# Patient Record
Sex: FEMALE | Race: WHITE | NOT HISPANIC OR LATINO | Employment: PART TIME | URBAN - METROPOLITAN AREA
[De-identification: names, ages, dates, MRNs, and addresses within clinical notes are randomized per-mention and may not be internally consistent; named-entity substitution may affect disease eponyms.]

---

## 2018-08-13 ENCOUNTER — OFFICE VISIT (OUTPATIENT)
Dept: URGENT CARE | Facility: CLINIC | Age: 53
End: 2018-08-13
Payer: MEDICAID

## 2018-08-13 VITALS
OXYGEN SATURATION: 98 % | BODY MASS INDEX: 27.21 KG/M2 | HEIGHT: 59 IN | SYSTOLIC BLOOD PRESSURE: 140 MMHG | WEIGHT: 135 LBS | HEART RATE: 80 BPM | DIASTOLIC BLOOD PRESSURE: 90 MMHG | TEMPERATURE: 97 F

## 2018-08-13 DIAGNOSIS — F32.A DEPRESSION, UNSPECIFIED DEPRESSION TYPE: ICD-10-CM

## 2018-08-13 DIAGNOSIS — E11.8 TYPE 2 DIABETES MELLITUS WITH COMPLICATION, WITH LONG-TERM CURRENT USE OF INSULIN: Primary | ICD-10-CM

## 2018-08-13 DIAGNOSIS — Z76.0 ENCOUNTER FOR MEDICATION REFILL: ICD-10-CM

## 2018-08-13 DIAGNOSIS — Z79.4 TYPE 2 DIABETES MELLITUS WITH COMPLICATION, WITH LONG-TERM CURRENT USE OF INSULIN: Primary | ICD-10-CM

## 2018-08-13 PROCEDURE — 99203 OFFICE O/P NEW LOW 30 MIN: CPT | Mod: S$GLB,,, | Performed by: NURSE PRACTITIONER

## 2018-08-13 RX ORDER — SERTRALINE HYDROCHLORIDE 50 MG/1
50 TABLET, FILM COATED ORAL DAILY
Qty: 30 TABLET | Refills: 0 | Status: SHIPPED | OUTPATIENT
Start: 2018-08-13 | End: 2020-05-21

## 2018-08-13 RX ORDER — INSULIN GLARGINE 100 [IU]/ML
53 INJECTION, SOLUTION SUBCUTANEOUS NIGHTLY
Qty: 15.9 ML | Refills: 0 | Status: SHIPPED | OUTPATIENT
Start: 2018-08-13 | End: 2019-12-20

## 2018-08-13 NOTE — PROGRESS NOTES
"Subjective:       Patient ID: Rufina Braun is a 52 y.o. female.    Vitals:  height is 4' 11" (1.499 m) and weight is 61.2 kg (135 lb). Her temperature is 97.1 °F (36.2 °C). Her blood pressure is 140/90 (abnormal) and her pulse is 80. Her oxygen saturation is 98%.     Chief Complaint: Depression (anxiety med refill) and Diabetes (needs insulin refill)    Pt reports she needs a refill on her insulin medication and anxiety/depression medication she ran out of medications. She does not have any complaints today. She recently had to switch doctors due to an insurance change. She is unable to get in to see a new primary care doctor until 08/20. She needs refills on her Lantus and Zoloft.       Diabetes   She has type 2 diabetes mellitus. Her disease course has been fluctuating. Pertinent negatives for hypoglycemia include no headaches or nervousness/anxiousness. Pertinent negatives for diabetes include no blurred vision and no chest pain. Current diabetic treatment includes insulin injections. Her weight is stable. She is following a diabetic diet. She has not had a previous visit with a dietitian.     Review of Systems   Constitution: Negative for chills and fever.   HENT: Negative for sore throat.    Eyes: Negative for blurred vision.   Cardiovascular: Negative for chest pain.   Respiratory: Negative for shortness of breath.    Skin: Negative for rash.   Musculoskeletal: Negative for back pain and joint pain.   Gastrointestinal: Negative for abdominal pain, diarrhea, nausea and vomiting.   Neurological: Negative for headaches.   Psychiatric/Behavioral: The patient is not nervous/anxious.        Objective:      Physical Exam   Constitutional: She is oriented to person, place, and time. She appears well-developed and well-nourished. She is cooperative.  Non-toxic appearance. She does not appear ill. No distress.   HENT:   Head: Normocephalic and atraumatic.   Right Ear: Hearing, tympanic membrane, external ear and ear " canal normal.   Left Ear: Hearing, tympanic membrane, external ear and ear canal normal.   Nose: Nose normal. No mucosal edema, rhinorrhea or nasal deformity. No epistaxis. Right sinus exhibits no maxillary sinus tenderness and no frontal sinus tenderness. Left sinus exhibits no maxillary sinus tenderness and no frontal sinus tenderness.   Mouth/Throat: Uvula is midline, oropharynx is clear and moist and mucous membranes are normal. No trismus in the jaw. Normal dentition. No uvula swelling. No posterior oropharyngeal erythema.   Eyes: Conjunctivae and lids are normal. Right eye exhibits no discharge. Left eye exhibits no discharge. No scleral icterus.   Sclera clear bilat   Neck: Trachea normal, normal range of motion, full passive range of motion without pain and phonation normal. Neck supple.   Cardiovascular: Normal rate, regular rhythm, normal heart sounds, intact distal pulses and normal pulses.   Pulmonary/Chest: Effort normal and breath sounds normal. No respiratory distress.   Abdominal: Soft. Normal appearance and bowel sounds are normal. She exhibits no distension, no pulsatile midline mass and no mass. There is no tenderness.   Musculoskeletal: Normal range of motion. She exhibits no edema or deformity.   Neurological: She is alert and oriented to person, place, and time. She exhibits normal muscle tone. Coordination normal.   Skin: Skin is warm, dry and intact. She is not diaphoretic. No pallor.   Psychiatric: She has a normal mood and affect. Her speech is normal and behavior is normal. Judgment and thought content normal. Cognition and memory are normal.   Nursing note and vitals reviewed.      Assessment:       1. Type 2 diabetes mellitus with complication, with long-term current use of insulin    2. Depression, unspecified depression type    3. Encounter for medication refill        Plan:         Type 2 diabetes mellitus with complication, with long-term current use of insulin  -     insulin  glargine (LANTUS) 100 unit/mL injection; Inject 53 Units into the skin every evening.  Dispense: 15.9 mL; Refill: 0    Depression, unspecified depression type  -     sertraline (ZOLOFT) 50 MG tablet; Take 1 tablet (50 mg total) by mouth once daily.  Dispense: 30 tablet; Refill: 0    Encounter for medication refill      Patient Instructions       Depression  Depression is one of the most common mental health problems today. It is not just a state of unhappiness or sadness. It is a true disease. The cause seems to be related to a decrease in chemicals that transmit signals in the brain. Having a family history of depression, alcoholism, or suicide increases the risk. Chronic illness, chronic pain, migraine headaches and high emotional stress also increase the risk.  Depression is something we tend to recognize in others, but may have a hard time seeing in ourselves. It can show in many physical and emotional ways:  · Loss of appetite  · Over-eating  · Not being able to sleep  · Sleeping too much  · Tiredness not related to physical exertion  · Restlessness or irritability  · Slowness of movement or speech  · Feeling depressed or withdrawn  · Loss of interest in things you once enjoyed  · Trouble concentrating, poor memory, trouble making decisions  · Thoughts of harming or killing oneself, or thoughts that life is not worth living  · Low self-esteem  The treatment for depression may include both medicine and psychotherapy. Antidepressants can reduce suffering and can improve the ability to function during the depressed period. Therapy can offer emotional support and help you understand emotional factors that may be causing the depression.  Home care  · On-going care and support helps people manage this disease.  Find a healthcare provider and therapist who meet your needs. Seek help when you feel like you may be getting ill.  · Be kind to yourself. Make it a point to do things that you enjoy (gardening, walking in  nature, going to a movie, etc.). Reward yourself for small successes.  · Take care of your physical body. Eat a balanced diet (low in saturated fat and high in fruits and vegetables). Exercise at least 3 times a week for 30 minutes. Even mild-moderate exercise (like brisk walking) can make you feel better.  · Avoid alcohol, which can make depression worse.  · Take medicine as prescribed.  · Tell each of your healthcare providers about all of the prescription drugs, over-the-counter medicines, vitamins, and supplements you take. Certain supplements interact with medicines and can result in dangerous side effects. Ask your pharmacist when you have questions about drug interactions.  · Talk with your family and trusted friends about your feelings and thoughts. Ask them to help you recognize behavior changes early so you can get help and, if needed, medicine can be adjusted.  Follow-up care  Follow up with your healthcare provider, or as advised.  Call 911  Call 911 if you:  · Have suicidal thoughts, a suicide plan, and the means to carry out the plan  · Have trouble breathing  · Are very confused  · Feel very drowsy or have trouble awakening  · Faint or lose consciousness  · Have new chest pain that becomes more severe, lasts longer, or spreads into your shoulder, arm, neck, jaw or back  When to seek medical advice  Call your healthcare provider right away if any of these occur:  · Feeling extreme depression, fear, anxiety, or anger toward yourself or others  · Feeling out of control  · Feeling that you may try to harm yourself or another  · Hearing voices that others do not hear  · Seeing things that others do not see  · Cant sleep or eat for 3 days in a row  · Friends or family express concern over your behavior and ask you to seek help  Date Last Reviewed: 9/29/2015  © 7721-6797 [employer]. [address] [place] [address], [place] [address]. All rights reserved. This information is not intended as a  "substitute for professional medical care. Always follow your healthcare professional's instructions.        Insulin: How to Use and Where to Inject     Injection sites in adults include the belly (abdomen), front of thighs, back of upper arms and upper buttocks.     Insulin is given with shots (injections) in the fatty layer under the skin (subcutaneous). Some people use an implanted device called an insulin pump, while others inject insulin using prefilled "pens." Your healthcare provider, nurse, diabetes educator, or others will give you instructions about using insulin. Make sure you follow all instructions about when and where you use it.  Where to inject your insulin  · Injecting insulin in the same area (like your belly) means that insulin is absorbed the same way.  · Insulin is most often injected in the abdominal fat. That is where it is absorbed most quickly.  · Change the injection site each time you give yourself insulin. This helps prevent problems.  · Have an organized way of moving from site to site.  · Leave at least 2 inches around your belly button (navel).  When to inject your insulin  · Make sure you follow your healthcare provider's instructions about when you should give yourself insulin.  · The timing of insulin injections with meals or snacks is very important.      Getting ready to inject insulin from a bottle  · Wash your hands. Use soap and warm water.  · Wipe the top of the insulin bottle (vial) with alcohol.  Preparing the insulin  · Pull back the plunger until the end of the plunger is even with the number of units of insulin you take. Always read the numbers of units of insulin at your eye level.   · Insert the needle into the top of the bottle. Hold the needle and bottle straight up and down. Make sure the needle is in the insulin. Then push the plunger in all the way, pushing air into the insulin.  · Turn the bottle and syringe upside down so that the bottle is on top.  · Pull back on " the plunger until the end of the plunger is even with the number of units of insulin you take.  · Remove the needle. Then tap the syringe with a fingertip to remove any air bubbles.  Injecting the insulin  · Gently pinch up about 1 inch of skin. Do not squeeze the skin.  · Insert the needle.  · Push in the plunger. Press until the syringe is empty. Let go of the skin. Then remove the needle. Dont rub the site after you remove the needle.  Disposing of the syringe  · Put the needle and syringe in a sharps container. And dont recap the needle.  · When the sharps container is full, put it into a garbage bag and secure the top. Label the bag needles or sharps.  · Call your local waste company to ask about removing the sharps container. You can also check with the [employer] Safe [employer] Needle Disposal at 429-504-0741, www.safeneedledisposal.org.  Storing your insulin  · Keep unopened insulin bottles in the refrigerator. An open bottle can be stored at room temperature (such as on the kitchen counter). But dont let the insulin get too hot. Always keep it below 86°F (30°C). And never let it freeze!  · Always use insulin before the expiration date on the bottle. Throw  bottles away.  · Use insulin within 28 days of opening the bottle. After 28 days, throw it away. To remember, write the date you opened it on the bottle.  · When you travel, be sure to take all of your diabetes supplies. Put them in a bag made to protect insulin from heat and cold. Always keep them with you. If there is a delay or your suitcase is lost, you will have what you need.  · Never leave insulin in the car. It can get too hot or too cold.  Date Last Reviewed: 2016  © 2350-1735 [employer]. [address] [place] [address], [place] [address]. All rights reserved. This information is not intended as a substitute for professional medical care. Always follow your healthcare professional's instructions.    -Your Zoloft and  Lantus have been refilled.  -Establish care with your new primary care doctor on 08/20 as discussed.    If your condition worsens or fails to improve we recommend that you receive another evaluation at the emergency room immediately or contact your primary medical clinic to discuss your concerns.   You must understand that you have received an Urgent Care treatment only and that you may be released before all of your medical problems are known or treated. You, the patient, will arrange for follow up care as instructed.

## 2018-08-13 NOTE — PATIENT INSTRUCTIONS
Depression  Depression is one of the most common mental health problems today. It is not just a state of unhappiness or sadness. It is a true disease. The cause seems to be related to a decrease in chemicals that transmit signals in the brain. Having a family history of depression, alcoholism, or suicide increases the risk. Chronic illness, chronic pain, migraine headaches and high emotional stress also increase the risk.  Depression is something we tend to recognize in others, but may have a hard time seeing in ourselves. It can show in many physical and emotional ways:  · Loss of appetite  · Over-eating  · Not being able to sleep  · Sleeping too much  · Tiredness not related to physical exertion  · Restlessness or irritability  · Slowness of movement or speech  · Feeling depressed or withdrawn  · Loss of interest in things you once enjoyed  · Trouble concentrating, poor memory, trouble making decisions  · Thoughts of harming or killing oneself, or thoughts that life is not worth living  · Low self-esteem  The treatment for depression may include both medicine and psychotherapy. Antidepressants can reduce suffering and can improve the ability to function during the depressed period. Therapy can offer emotional support and help you understand emotional factors that may be causing the depression.  Home care  · On-going care and support helps people manage this disease.  Find a healthcare provider and therapist who meet your needs. Seek help when you feel like you may be getting ill.  · Be kind to yourself. Make it a point to do things that you enjoy (gardening, walking in nature, going to a movie, etc.). Reward yourself for small successes.  · Take care of your physical body. Eat a balanced diet (low in saturated fat and high in fruits and vegetables). Exercise at least 3 times a week for 30 minutes. Even mild-moderate exercise (like brisk walking) can make you feel better.  · Avoid alcohol, which can make  depression worse.  · Take medicine as prescribed.  · Tell each of your healthcare providers about all of the prescription drugs, over-the-counter medicines, vitamins, and supplements you take. Certain supplements interact with medicines and can result in dangerous side effects. Ask your pharmacist when you have questions about drug interactions.  · Talk with your family and trusted friends about your feelings and thoughts. Ask them to help you recognize behavior changes early so you can get help and, if needed, medicine can be adjusted.  Follow-up care  Follow up with your healthcare provider, or as advised.  Call 911  Call 911 if you:  · Have suicidal thoughts, a suicide plan, and the means to carry out the plan  · Have trouble breathing  · Are very confused  · Feel very drowsy or have trouble awakening  · Faint or lose consciousness  · Have new chest pain that becomes more severe, lasts longer, or spreads into your shoulder, arm, neck, jaw or back  When to seek medical advice  Call your healthcare provider right away if any of these occur:  · Feeling extreme depression, fear, anxiety, or anger toward yourself or others  · Feeling out of control  · Feeling that you may try to harm yourself or another  · Hearing voices that others do not hear  · Seeing things that others do not see  · Cant sleep or eat for 3 days in a row  · Friends or family express concern over your behavior and ask you to seek help  Date Last Reviewed: 9/29/2015  © 3773-2196 [employer]. [address] [place] [address]. All rights reserved. This information is not intended as a substitute for professional medical care. Always follow your healthcare professional's instructions.        Insulin: How to Use and Where to Inject     Injection sites in adults include the belly (abdomen), front of thighs, back of upper arms and upper buttocks.     Insulin is given with shots (injections) in the fatty layer under the skin  "(subcutaneous). Some people use an implanted device called an insulin pump, while others inject insulin using prefilled "pens." Your healthcare provider, nurse, diabetes educator, or others will give you instructions about using insulin. Make sure you follow all instructions about when and where you use it.  Where to inject your insulin  · Injecting insulin in the same area (like your belly) means that insulin is absorbed the same way.  · Insulin is most often injected in the abdominal fat. That is where it is absorbed most quickly.  · Change the injection site each time you give yourself insulin. This helps prevent problems.  · Have an organized way of moving from site to site.  · Leave at least 2 inches around your belly button (navel).  When to inject your insulin  · Make sure you follow your healthcare provider's instructions about when you should give yourself insulin.  · The timing of insulin injections with meals or snacks is very important.      Getting ready to inject insulin from a bottle  · Wash your hands. Use soap and warm water.  · Wipe the top of the insulin bottle (vial) with alcohol.  Preparing the insulin  · Pull back the plunger until the end of the plunger is even with the number of units of insulin you take. Always read the numbers of units of insulin at your eye level.   · Insert the needle into the top of the bottle. Hold the needle and bottle straight up and down. Make sure the needle is in the insulin. Then push the plunger in all the way, pushing air into the insulin.  · Turn the bottle and syringe upside down so that the bottle is on top.  · Pull back on the plunger until the end of the plunger is even with the number of units of insulin you take.  · Remove the needle. Then tap the syringe with a fingertip to remove any air bubbles.  Injecting the insulin  · Gently pinch up about 1 inch of skin. Do not squeeze the skin.  · Insert the needle.  · Push in the plunger. Press until the syringe " is empty. Let go of the skin. Then remove the needle. Dont rub the site after you remove the needle.  Disposing of the syringe  · Put the needle and syringe in a sharps container. And dont recap the needle.  · When the sharps container is full, put it into a garbage bag and secure the top. Label the bag needles or sharps.  · Call your local waste company to ask about removing the sharps container. You can also check with the [employer] Safe [employer] Needle Disposal at 093-609-0570, www.safeneedledisposal.org.  Storing your insulin  · Keep unopened insulin bottles in the refrigerator. An open bottle can be stored at room temperature (such as on the kitchen counter). But dont let the insulin get too hot. Always keep it below 86°F (30°C). And never let it freeze!  · Always use insulin before the expiration date on the bottle. Throw  bottles away.  · Use insulin within 28 days of opening the bottle. After 28 days, throw it away. To remember, write the date you opened it on the bottle.  · When you travel, be sure to take all of your diabetes supplies. Put them in a bag made to protect insulin from heat and cold. Always keep them with you. If there is a delay or your suitcase is lost, you will have what you need.  · Never leave insulin in the car. It can get too hot or too cold.  Date Last Reviewed: 2016  © 6988-7096 [employer]. [address] [place] [address], [place] [address]. All rights reserved. This information is not intended as a substitute for professional medical care. Always follow your healthcare professional's instructions.    -Your Zoloft and Lantus have been refilled.  -Establish care with your new primary care doctor on  as discussed.    If your condition worsens or fails to improve we recommend that you receive another evaluation at the emergency room immediately or contact your primary medical clinic to discuss your concerns.   You must understand that you have received  an Urgent Care treatment only and that you may be released before all of your medical problems are known or treated. You, the patient, will arrange for follow up care as instructed.

## 2019-12-20 PROBLEM — D86.9 SARCOIDOSIS: Status: ACTIVE | Noted: 2019-12-20

## 2019-12-20 PROBLEM — E11.9 DIABETES MELLITUS WITHOUT COMPLICATION: Status: ACTIVE | Noted: 2019-12-20

## 2019-12-20 PROBLEM — E78.5 HYPERLIPIDEMIA: Status: ACTIVE | Noted: 2019-12-20

## 2019-12-20 PROBLEM — F32.A DEPRESSION: Status: ACTIVE | Noted: 2019-12-20

## 2019-12-20 PROBLEM — I10 HYPERTENSION, ESSENTIAL: Status: ACTIVE | Noted: 2019-12-20

## 2020-07-24 ENCOUNTER — PATIENT OUTREACH (OUTPATIENT)
Dept: ADMINISTRATIVE | Facility: HOSPITAL | Age: 55
End: 2020-07-24

## 2020-07-24 NOTE — PROGRESS NOTES
Updated Health Maintenance Dashboard with colonoscopy report with records from [facility]. Also updated Med History w/personal history of colonic polyps

## 2020-08-10 ENCOUNTER — PATIENT OUTREACH (OUTPATIENT)
Dept: ADMINISTRATIVE | Facility: HOSPITAL | Age: 55
End: 2020-08-10

## 2020-10-19 PROBLEM — J40 BRONCHITIS: Status: ACTIVE | Noted: 2020-10-19

## 2021-02-05 PROBLEM — J32.9 SINUSITIS: Status: ACTIVE | Noted: 2021-02-05
